# Patient Record
Sex: FEMALE | Race: WHITE | NOT HISPANIC OR LATINO | Employment: FULL TIME | URBAN - NONMETROPOLITAN AREA
[De-identification: names, ages, dates, MRNs, and addresses within clinical notes are randomized per-mention and may not be internally consistent; named-entity substitution may affect disease eponyms.]

---

## 2021-01-29 ENCOUNTER — OFFICE VISIT (OUTPATIENT)
Dept: OBSTETRICS AND GYNECOLOGY | Facility: CLINIC | Age: 41
End: 2021-01-29

## 2021-01-29 VITALS
SYSTOLIC BLOOD PRESSURE: 120 MMHG | WEIGHT: 153 LBS | HEIGHT: 60 IN | DIASTOLIC BLOOD PRESSURE: 60 MMHG | BODY MASS INDEX: 30.04 KG/M2

## 2021-01-29 DIAGNOSIS — Z87.42 PERSONAL HISTORY OF ENDOMETRIOSIS: ICD-10-CM

## 2021-01-29 DIAGNOSIS — Z01.419 ENCOUNTER FOR GYNECOLOGICAL EXAMINATION (GENERAL) (ROUTINE) WITHOUT ABNORMAL FINDINGS: Primary | ICD-10-CM

## 2021-01-29 DIAGNOSIS — N94.6 DYSMENORRHEA: ICD-10-CM

## 2021-01-29 DIAGNOSIS — Z31.9 INFERTILITY MANAGEMENT: ICD-10-CM

## 2021-01-29 PROCEDURE — 99385 PREV VISIT NEW AGE 18-39: CPT | Performed by: OBSTETRICS & GYNECOLOGY

## 2021-01-29 PROCEDURE — 99203 OFFICE O/P NEW LOW 30 MIN: CPT | Performed by: OBSTETRICS & GYNECOLOGY

## 2021-01-29 NOTE — PROGRESS NOTES
"Chief Complaint  Gynecologic Exam     History of Present Illness:  Patient is 40 y.o.  who presents to [facility] is a new patient for her annual examination.  Patient also has complaints of infertility.  Patient reports her last Pap smear was 10 years ago and was normal.  Patient has not had a mammogram.  Patient does not have any family history of breast cancer.  Patient does have a family history of colon cancer however.  Patient was told she would need screening starting at the age of 40.  Patient does have hypothyroidism.  She reports having labs 6 months ago.  Patient does have an appointment scheduled with an internist for an annual examination.  Patient gives a history of infertility.  Patient conceived in .  She had her daughter by  section.  Patient then had trouble conceiving.  Patient reports having a diagnostic laparoscopy in [place] approximately 9 years ago.  Patient was told endometriosis.  She was told she had blockage of one fallopian tube.  Patient reports later having an HSG in [place].  She was told her tubes were open.  Patient did conceived through IVF twins.  Patient did miscarry however and had to have a D&C.  Patient reports her menstrual cycles are usually every 27 days.  They will last for 5 days.  She reports moderate flow.  Patient will have moderate cramping associated with her menstrual cycles as well.  Patient is inquiring regarding repeat laparoscopy.  Patient reports for the last 2 years she has not been aggressive at trying to conceive.  Patient would however like to look at her options.    Physical Examination:  Vital Signs: /60   Ht 152.4 cm (60\")   Wt 69.4 kg (153 lb)   BMI 29.88 kg/m²     General Appearance: alert, appears stated age, and cooperative  Breasts: Examined in supine position  Symmetric without masses or skin dimpling  Nipples normal without inversion, lesions or discharge  There are no palpable " axillary nodes  Abdomen: no masses, no hepatomegaly, no splenomegaly, soft non-tender, no guarding and no rebound tenderness  Pelvic: Clinical staff was present for exam  External genitalia:  normal appearance of the external genitalia including Bartholin's and [place]'s glands.  :  urethral meatus normal;  Vaginal:  normal pink mucosa without prolapse or lesions.  Cervix:  normal appearance.  Uterus:  normal size, shape and consistency.  Adnexa:  normal bimanual exam of the adnexa.  Pap smear done and specimen sent using Thin-Prep technique    Data Review:  The following data was reviewed by: Rebecca Olson MD on 01/29/2021:     Labs:    Imaging:    Medical Records:  None    Assessment and Plan   Problem List Items Addressed This Visit     None      Visit Diagnoses     Encounter for gynecological examination (general) (routine) without abnormal findings    -  Primary  Pap was done today.  If she does not receive the results of the Pap within 2 weeks  time, she was instructed to call to find out the results.  I explained to Louis that the recommendations for Pap smear interval in a low risk patient has lengthened to 3 years time if cytology alone normal or  5 years time if both cytology and HPV testing were normal.  I encouraged her to be seen yearly for a full physical exam including breast and pelvic exam even during the off years when PAP's will not be performed.    Relevant Orders    Liquid-based Pap Smear, Screening    Personal history of endometriosis      Patient with personal history of endometriosis and infertility.  Patient is to sign to obtain a copy of her previous medical records.    Infertility management      I have discussed with Louis Barrientos the definition of infertility which is failure to achieve pregnancy within 12 months of unprotected intercourse in women younger than 35 years of age or within 6 months of women older than 35 years of age.  Infertility can affect up to 15% of couples.  I have  discussed with her components of an initial evaluation including her medical history, physical examination, and additional testing as indicated.  I have discussed a step wise approach for infertility evaluation starting from the least invasive testing to more invasive testing.  Tests focusing on ovarian reserve, ovulatory function, and structural abnormalities in the female patient have been discussed.  I have discussed testing for ovarian reserve with antimullerian hormone or basal follicle stimulating hormone with estradiol on days 2-5 of her cycle.  I have discussed assessing ovulatory function with urine ovulatory kits and serum progesterone testing during the mid luteal phase.  I have also discussed assessing tubal patency as well with a hysterosalpingogram in addition to evaluating her uterine structure with ultrasound.  She has been informed she may need additional testing pending these exams.  She has also been informed that male factor is a cause of infertility in 40-50% of couples.  Her partner's medical history and evaluation is important including a semen analysis if not already done.  Louis Barrientos has also been informed that unexplained infertility may be diagnosed in up to 30% of infertile couples.  She has been informed if she has not already been on folic acid regarding the need for supplementation with at least 400 mcg per day in average risk women.  The patient is to return on day 21 or 22 of her cycle for serum progesterone.  Plan pending results.    Relevant Orders    Progesterone    Dysmenorrhea      Louis Barrientos was counseled regarding the various etiologies for dysmenorrhea.  The patient was informed that primary dysmenorrhea is painful menstruation in the absence of pathology.  The various options for dysmenorrhea were discussed to include nonsteroidal antiinflammatory drugs, hormonal suppression, or both.  The patient was informed secondary dysmenorrhea is a result of pelvic pathology and is  more common in patients with severe dysmenorrhea at menarche or progressively worsening dysmenorrhea, abnormal uterine bleeding, mid-cycle or acyclic pain, infertility, family history of endometriosis, dyspareunia, or lack of response to empiric therapy.  Evaluation for secondary causes includes pelvic ultrasonography and possible laparoscopy.  The various treatment options for secondary dysmenorrhea depends upon the etiology as discussed.  We will schedule transvaginal ultrasound post menses as discussed.  Plan pending results.    Relevant Orders    US Non-ob Transvaginal          Follow Up/Instructions:    Patient was given instructions and counseling regarding her condition or for health maintenance advice. Please see specific information pulled into the AVS if appropriate.     Note: Speech recognition transcription software may have been used to dictate portions of this document.  An attempt at proofreading has been made though minor errors in transcription may still be present.    This note was electronically signed.  Rebecca Olson M.D.

## 2021-02-04 DIAGNOSIS — Z01.419 ENCOUNTER FOR GYNECOLOGICAL EXAMINATION (GENERAL) (ROUTINE) WITHOUT ABNORMAL FINDINGS: ICD-10-CM

## 2023-10-16 ENCOUNTER — OFFICE VISIT (OUTPATIENT)
Dept: OBSTETRICS AND GYNECOLOGY | Facility: CLINIC | Age: 43
End: 2023-10-16
Payer: COMMERCIAL

## 2023-10-16 VITALS
HEIGHT: 65 IN | WEIGHT: 159 LBS | SYSTOLIC BLOOD PRESSURE: 120 MMHG | DIASTOLIC BLOOD PRESSURE: 60 MMHG | BODY MASS INDEX: 26.49 KG/M2

## 2023-10-16 DIAGNOSIS — Z30.8 ENCOUNTER FOR OTHER CONTRACEPTIVE MANAGEMENT: Primary | ICD-10-CM

## 2023-10-16 DIAGNOSIS — N92.0 MENORRHAGIA WITH REGULAR CYCLE: ICD-10-CM

## 2023-10-16 DIAGNOSIS — N94.6 DYSMENORRHEA: ICD-10-CM

## 2023-10-16 PROCEDURE — 99214 OFFICE O/P EST MOD 30 MIN: CPT | Performed by: OBSTETRICS & GYNECOLOGY

## 2023-10-16 RX ORDER — LEVONORGESTREL AND ETHINYL ESTRADIOL 0.15-0.03
1 KIT ORAL DAILY
Qty: 91 TABLET | Refills: 0 | Status: SHIPPED | OUTPATIENT
Start: 2023-10-16 | End: 2024-10-15

## 2023-10-16 RX ORDER — LEVOTHYROXINE SODIUM 0.05 MG/1
TABLET ORAL
COMMUNITY
Start: 2023-07-21

## 2023-10-16 NOTE — PROGRESS NOTES
"Chief Complaint  Contraception (Wants to discuss oral contraceptives. )     History of Present Illness:  Patient is 43 y.o.  who presents to [facility] here for evaluation of oral contraceptives.  Patient's menstrual cycles have been heavy and irregular.  They will vary slightly in timing.  They are usually every 26 days.  Patient is traveling home to [facility] in December.  She is scheduled to be on her menstrual cycle at that time.  Patient does not want to be on her menses at that time.  She has never been on oral contraceptives.  She has previously been seen for infertility.  She has continued on her Synthroid.  She denies any history of blood clots or clotting disorders.    History  Past Medical History:   Diagnosis Date    Disease of thyroid gland     Endometriosis     Ovarian cyst      Current Outpatient Medications on File Prior to Visit   Medication Sig Dispense Refill    levothyroxine (SYNTHROID, LEVOTHROID) 50 MCG tablet take 1 tablet by mouth once daily in the morning on an empty stomach       No current facility-administered medications on file prior to visit.     No Known Allergies  Past Surgical History:   Procedure Laterality Date     SECTION  2013     Family History   Problem Relation Age of Onset    Colon cancer Other     Diabetes Other     Hypertension Other      Social History     Socioeconomic History    Marital status:    Tobacco Use    Smoking status: Never    Smokeless tobacco: Never   Vaping Use    Vaping Use: Never used   Substance and Sexual Activity    Alcohol use: Never    Drug use: Never    Sexual activity: Yes     Partners: Male     Birth control/protection: None       Physical Examination:  Vital Signs: /60   Ht 165.1 cm (65\")   Wt 72.1 kg (159 lb)   BMI 26.46 kg/m²     General Appearance: alert, appears stated age, and cooperative  Breasts: Not performed.  Abdomen: no masses, no hepatomegaly, no splenomegaly, soft " non-tender, no guarding, and no rebound tenderness  Pelvic: Not performed.    Data Review:  The following data was reviewed by: Rebecca Olson MD on 10/16/2023:     Labs:    Imaging:    Medical Records:  None    Assessment and Plan   1. Encounter for other contraceptive management  Contraceptive counseling was provided.  The various options for contraception was discussed including natural family planning, withdrawal method, barrier methods, spermicides, oral contraception, transdermal patch, vaginal ring, injection, implant, and IUDs.  The risks, complications, failure rates, and benefits of each were discussed.   Prescription is given as noted.  Instructions and precautions have been given.    2. Menorrhagia with regular cycle  Patient with menorrhagia as noted.  She is currently on her menstrual cycle now.  Prescription has been given for extended oral contraceptives.  Instructions and precautions have been given.  I have also discussed with the patient the increased risk of blood clots with the DVTs and PE.  Patient is also flying with a 10-hour flight.  She is instructed in frequent walking.  Instructions and precautions have been given.  She voices understanding.    3. Dysmenorrhea  Louis Barrientos was counseled regarding the various etiologies for dysmenorrhea.  The patient was informed that primary dysmenorrhea is painful menstruation in the absence of pathology.  The various options for dysmenorrhea were discussed to include nonsteroidal antiinflammatory drugs, hormonal suppression, or both.  The patient was informed secondary dysmenorrhea is a result of pelvic pathology and is more common in patients with severe dysmenorrhea at menarche or progressively worsening dysmenorrhea, abnormal uterine bleeding, mid-cycle or acyclic pain, infertility, family history of endometriosis, dyspareunia, or lack of response to empiric therapy.  Evaluation for secondary causes includes pelvic ultrasonography and possible  laparoscopy.  The various treatment options for secondary dysmenorrhea depends upon the etiology as discussed.   We will plan a trial with oral contraceptives as noted.    Follow Up/Instructions:  Follow up as noted.  Patient was given instructions and counseling regarding her condition or for health maintenance advice. Please see specific information pulled into the AVS if appropriate.     Note: Speech recognition transcription software may have been used to dictate portions of this document.  An attempt at proofreading has been made though minor errors in transcription may still be present.    This note was electronically signed.  Rebecca Olson M.D.